# Patient Record
Sex: FEMALE | URBAN - METROPOLITAN AREA
[De-identification: names, ages, dates, MRNs, and addresses within clinical notes are randomized per-mention and may not be internally consistent; named-entity substitution may affect disease eponyms.]

---
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
## 2023-10-06 ENCOUNTER — TELEPHONE (OUTPATIENT)
Dept: ENDOCRINOLOGY | Age: 18
End: 2023-10-06

## 2023-10-06 NOTE — TELEPHONE ENCOUNTER
Patient's mom is our clinic patient.  Okay to schedule for Hashimoto's, she has a referral from [facility]

## 2023-10-06 NOTE — TELEPHONE ENCOUNTER
Per Dr. Marbin Lynn this pt can be scheduled as a NP for Hashimoto's. She has a referral from [facility].  You can schedule 2 back to back VV as a NP to get her seen sooner

## 2023-10-20 ENCOUNTER — TELEPHONE (OUTPATIENT)
Dept: ENDOCRINOLOGY | Age: 18
End: 2023-10-20

## 2023-10-20 ENCOUNTER — TELEMEDICINE (OUTPATIENT)
Dept: ENDOCRINOLOGY | Age: 18
End: 2023-10-20

## 2023-10-20 DIAGNOSIS — E03.9 ACQUIRED HYPOTHYROIDISM: Primary | ICD-10-CM

## 2023-10-20 PROCEDURE — 99204 OFFICE O/P NEW MOD 45 MIN: CPT | Performed by: INTERNAL MEDICINE

## 2023-10-20 RX ORDER — NORETHINDRONE ACETATE AND ETHINYL ESTRADIOL, ETHINYL ESTRADIOL AND FERROUS FUMARATE 1MG-10(24)
1 KIT ORAL DAILY
COMMUNITY

## 2023-10-20 RX ORDER — ONDANSETRON 4 MG/1
4 TABLET, FILM COATED ORAL EVERY 8 HOURS PRN
COMMUNITY

## 2023-10-20 RX ORDER — LEVOTHYROXINE SODIUM 0.05 MG/1
50 TABLET ORAL DAILY
Qty: 30 TABLET | Refills: 3 | Status: SHIPPED | OUTPATIENT
Start: 2023-10-20

## 2023-10-20 RX ORDER — LEVOTHYROXINE SODIUM 0.05 MG/1
50 TABLET ORAL DAILY
COMMUNITY
End: 2023-10-20 | Stop reason: SDUPTHER

## 2023-10-20 NOTE — TELEPHONE ENCOUNTER
Pt called in with a fax number for [facility] for labs to be faxed to once Dr. Rye Qureshi places them  [phone]

## 2023-10-20 NOTE — PROGRESS NOTES
Patient was evaluated through a synchronous (real-time) audio-video encounter. The patient (or guardian if applicable) is aware that this is a billable service, which includes applicable co-pays. This Virtual Visit was conducted with patient's (and/or legal guardian's) consent. Patient identification was verified, and a caregiver was present when appropriate. The patient was located at Home:   Provider was located at [address] [place] [address]! Confirm you are appropriately licensed, registered, or certified to deliver care in the state where the patient is located as indicated above. If you are not or unsure, please re-schedule the visit. Are you appropriately licensed in the patient's state?: Yes      Subjective:      22 y/o WF who is here for thyroid evaluation. Noted high TSH on labs    Labs:    9/23 TSH 7.66  12/22 TSH 2.99    She was started on levothyroxine 50mcg    Current complaints: see HPI  Increased sleep  Had to drink a lot of coffee, not much motivation  Improved with levothyroxine    Mild   Controlled    No worsening factors    History of radiation to patient's neck:   no  Family history includes mom, dad, brother Hashimoto's  Family history of thyroid cancer:  no    Current smoking of cigarettes or cigars: no    She states low iron, was started on iron supplement    SH:  She studies at [address] , on campus    No past medical history on file. No past surgical history on file. Current Outpatient Medications   Medication Sig Dispense Refill    levothyroxine (SYNTHROID) 50 MCG tablet Take 1 tablet by mouth Daily      ondansetron (ZOFRAN) 4 MG tablet Take 1 tablet by mouth every 8 hours as needed for Nausea or Vomiting      norethindrone-ethinyl estradiol-Fe (LO LOESTRIN FE) 1 MG-10 MCG / 10 MCG tablet Take 1 tablet by mouth daily       No current facility-administered medications for this visit. Review of Systems  Constitutional: Negative for weight loss recently .  Negative for fever

## 2023-10-23 LAB
FERRITIN: 52 NG/ML (ref 15–150)
IRON SATURATION: 27 % (ref 15–55)
IRON: 129 MCG/DL (ref 30–160)
TOTAL IRON BINDING CAPACITY: 470 MCG/DL (ref 260–450)
TRANSFERRIN: 336 MG/DL (ref 203–362)
TSH ULTRASENSITIVE: 1.46 (ref 0.27–4.2)

## 2023-10-24 LAB — THYROID PEROXIDASE ANTIBODIES: 97.42 IU/ML

## 2023-10-25 ENCOUNTER — TELEPHONE (OUTPATIENT)
Dept: ENDOCRINOLOGY | Age: 18
End: 2023-10-25

## 2023-10-25 LAB — THYROGLOBULIN AB: 31.2 IU/ML (ref 0–4)

## 2023-10-25 NOTE — TELEPHONE ENCOUNTER
Pt called in and gave lab results verbatim with Dr. Clovis Moise message. Pt understood. Pt asked when she should follow up. Please advise.

## 2024-04-15 DIAGNOSIS — E03.9 ACQUIRED HYPOTHYROIDISM: Primary | ICD-10-CM

## 2024-04-15 RX ORDER — LEVOTHYROXINE SODIUM 0.05 MG/1
50 TABLET ORAL DAILY
Qty: 30 TABLET | Refills: 3 | Status: SHIPPED | OUTPATIENT
Start: 2024-04-15

## 2024-04-15 NOTE — TELEPHONE ENCOUNTER
Medication:   Requested Prescriptions     Pending Prescriptions Disp Refills    levothyroxine (SYNTHROID) 50 MCG tablet [Pharmacy Med Name: LEVOTHYROXINE 50 MCG TABLET] 30 tablet 3     Sig: TAKE 1 TABLET BY MOUTH DAILY       Last Filled:      Patient Phone Number: [phone] (home)     Last appt: 10/20/2023   Next appt: 5/28/2024    Last Thyroid:   Lab Results   Component Value Date/Time    TSH 1.46 10/23/2023 12:45 PM

## 2024-05-28 ENCOUNTER — OFFICE VISIT (OUTPATIENT)
Dept: ENDOCRINOLOGY | Age: 19
End: 2024-05-28
Payer: COMMERCIAL

## 2024-05-28 VITALS
BODY MASS INDEX: 20.73 KG/M2 | OXYGEN SATURATION: 98 % | SYSTOLIC BLOOD PRESSURE: 124 MMHG | RESPIRATION RATE: 14 BRPM | DIASTOLIC BLOOD PRESSURE: 82 MMHG | WEIGHT: 117 LBS | HEART RATE: 68 BPM | HEIGHT: 63 IN

## 2024-05-28 DIAGNOSIS — E03.9 ACQUIRED HYPOTHYROIDISM: ICD-10-CM

## 2024-05-28 DIAGNOSIS — E03.9 ACQUIRED HYPOTHYROIDISM: Primary | ICD-10-CM

## 2024-05-28 PROCEDURE — 99214 OFFICE O/P EST MOD 30 MIN: CPT | Performed by: INTERNAL MEDICINE

## 2024-05-28 RX ORDER — FLUOXETINE HYDROCHLORIDE 20 MG/1
20 CAPSULE ORAL DAILY
COMMUNITY

## 2024-05-28 RX ORDER — OMEPRAZOLE 10 MG/1
10 CAPSULE, DELAYED RELEASE ORAL DAILY
COMMUNITY

## 2024-05-28 NOTE — PROGRESS NOTES
Patient was evaluated through a synchronous (real-time) audio-video encounter. The patient (or guardian if applicable) is aware that this is a billable service, which includes applicable co-pays. This Virtual Visit was conducted with patient's (and/or legal guardian's) consent. Patient identification was verified, and a caregiver was present when appropriate.   The patient was located at Home:   Provider was located at Home     STOP! Confirm you are appropriately licensed, registered, or certified to deliver care in the state where the patient is located as indicated above. If you are not or unsure, please re-schedule the visit.    Are you appropriately licensed in the patient's state?: Yes      Subjective:      18 y/o WF who is here for thyroid evaluation.     Interim:    Weight loss  Reports mononucleosis  Anxiety before levothyroxine    Noted high TSH on labs    Labs:    9/23 TSH 7.66  12/22 TSH 2.99    She was started on levothyroxine 50mcg    Current complaints: see HPI  Increased sleep  Had to drink a lot of coffee, not much motivation  Improved with levothyroxine    Mild   Controlled    No worsening factors    History of radiation to patient's neck:   no  Family history includes mom, dad, brother Hashimoto's  Family history of thyroid cancer:  no    Current smoking of cigarettes or cigars: no    She states low iron, was started on iron supplement    10/23 TPO Ab 97.42  Tg Ab 31.2    TSH 1.46      SH:  She studies at [employer]  In [place], on campus    No past medical history on file.  No past surgical history on file.  Current Outpatient Medications   Medication Sig Dispense Refill    FLUoxetine (PROZAC) 20 MG capsule Take 1 capsule by mouth daily      omeprazole (PRILOSEC) 10 MG delayed release capsule Take 1 capsule by mouth daily      levothyroxine (SYNTHROID) 50 MCG tablet TAKE 1 TABLET BY MOUTH DAILY 30 tablet 3    ondansetron (ZOFRAN) 4 MG tablet Take 1 tablet by mouth every 8 hours as needed for

## 2024-05-29 LAB
FERRITIN SERPL IA-MCNC: 37.3 NG/ML (ref 15–150)
TSH SERPL DL<=0.005 MIU/L-ACNC: 2.09 UIU/ML (ref 0.27–4.2)

## 2024-06-01 ENCOUNTER — HOSPITAL ENCOUNTER (OUTPATIENT)
Dept: ULTRASOUND IMAGING | Age: 19
Discharge: HOME | End: 2024-06-01
Attending: INTERNAL MEDICINE
Payer: COMMERCIAL

## 2024-06-01 DIAGNOSIS — E03.9 ACQUIRED HYPOTHYROIDISM: ICD-10-CM

## 2024-06-01 PROCEDURE — 76536 US EXAM OF HEAD AND NECK: CPT

## 2024-06-03 ENCOUNTER — TELEPHONE (OUTPATIENT)
Dept: ENDOCRINOLOGY | Age: 19
End: 2024-06-03

## 2024-07-29 ENCOUNTER — PATIENT MESSAGE (OUTPATIENT)
Dept: ENDOCRINOLOGY | Age: 19
End: 2024-07-29

## 2024-07-29 DIAGNOSIS — E03.9 ACQUIRED HYPOTHYROIDISM: Primary | ICD-10-CM

## 2024-07-29 NOTE — TELEPHONE ENCOUNTER
Please advise patient she can have thyroid level checked to assess if the hypothyroidism is worsening

## 2024-07-29 NOTE — TELEPHONE ENCOUNTER
From: Jeanette Santana  To: Dr. Sean Allan  Sent: 7/29/2024 2:01 PM EDT  Subject: Hashimotos    Hi Dr Allan,   Lately I’ve noticed my Hashimoto’s symptoms are getting worse. I don’t know if it could be stress induced (I’ve been very stressed a lot of the summer due to my schedule) or what but I’ve been feeling really freezing at work all the time, more tired during the day (even after having caffeine), and I’ve felt more achy, even when working out consistently. I want to get this straightened out before I go back to school in late August.

## 2024-07-31 DIAGNOSIS — E03.9 ACQUIRED HYPOTHYROIDISM: ICD-10-CM

## 2024-07-31 LAB — TSH SERPL DL<=0.005 MIU/L-ACNC: 2.29 UIU/ML (ref 0.27–4.2)

## 2024-08-09 DIAGNOSIS — E03.9 ACQUIRED HYPOTHYROIDISM: ICD-10-CM

## 2024-08-12 RX ORDER — LEVOTHYROXINE SODIUM 0.05 MG/1
50 TABLET ORAL DAILY
Qty: 30 TABLET | Refills: 4 | Status: SHIPPED | OUTPATIENT
Start: 2024-08-12

## 2024-08-12 NOTE — TELEPHONE ENCOUNTER
Medication:   Requested Prescriptions     Pending Prescriptions Disp Refills    levothyroxine (SYNTHROID) 50 MCG tablet [Pharmacy Med Name: LEVOTHYROXINE 50 MCG TABLET] 30 tablet 4     Sig: TAKE 1 TABLET BY MOUTH DAILY       Last Filled:      Patient Phone Number: [phone] (home)     Last appt: 5/28/2024   Next appt: 1/6/2025    Last Thyroid:   Lab Results   Component Value Date/Time    TSH 2.29 07/31/2024 12:22 PM

## 2024-11-19 DIAGNOSIS — E03.9 ACQUIRED HYPOTHYROIDISM: ICD-10-CM

## 2024-11-19 RX ORDER — LEVOTHYROXINE SODIUM 50 UG/1
50 TABLET ORAL DAILY
Qty: 30 TABLET | Refills: 4 | Status: SHIPPED | OUTPATIENT
Start: 2024-11-19

## 2024-11-19 NOTE — TELEPHONE ENCOUNTER
Requested Prescriptions     Pending Prescriptions Disp Refills    levothyroxine (SYNTHROID) 50 MCG tablet 30 tablet 4     Sig: Take 1 tablet by mouth Daily       Last OV 05/28/2024  Next OV 01/06/2025       Component  Ref Range & Units 7/31/24 1222 5/28/24 1308   TSH  0.27 - 4.20 uIU/mL 2.29 2.09

## 2024-11-19 NOTE — TELEPHONE ENCOUNTER
Refill is needed      levothyroxine (SYNTHROID) 50 MCG tablet     [facility]    [address] [place] [address]    [phone]

## 2025-01-06 ENCOUNTER — TELEMEDICINE (OUTPATIENT)
Dept: ENDOCRINOLOGY | Age: 20
End: 2025-01-06
Payer: COMMERCIAL

## 2025-01-06 DIAGNOSIS — E03.9 ACQUIRED HYPOTHYROIDISM: Primary | ICD-10-CM

## 2025-01-06 PROCEDURE — 99213 OFFICE O/P EST LOW 20 MIN: CPT | Performed by: INTERNAL MEDICINE

## 2025-01-06 NOTE — PROGRESS NOTES
Patient was evaluated through a synchronous (real-time) audio-video encounter. The patient (or guardian if applicable) is aware that this is a billable service, which includes applicable co-pays. This Virtual Visit was conducted with patient's (and/or legal guardian's) consent. Patient identification was verified, and a caregiver was present when appropriate.   The [place], [place]:   Provider was [place], [place]    STOP! Confirm you are appropriately licensed, registered, or certified to deliver care in the state where the patient is located as indicated above. If you are not or unsure, please re-schedule the visit.    Are you appropriately licensed in the patient's state?: Yes          Subjective:      20 y/o WF who is here for thyroid evaluation.     Interim:    fatigue    Noted high TSH on labs    Labs:    9/23 TSH 7.66  12/22 TSH 2.99    She was started on levothyroxine 50mcg    Current complaints: see HPI  Increased sleep  Had to drink a lot of coffee, not much motivation  Improved with levothyroxine    Mild   Controlled    No worsening factors    History of radiation to patient's neck:   no  Family history includes mom, dad, brother Hashimoto's  Family history of thyroid cancer:  no    Current smoking of cigarettes or cigars: no    She states low iron, was started on iron supplement  Not taking iron supplement regularly    10/23 TPO Ab 97.42  Tg Ab 31.2    TSH 1.46    7/24 TSH 2.29    SH:  She studies at [employer]  In [place], on campus    No past medical history on file.  No past surgical history on file.  Current Outpatient Medications   Medication Sig Dispense Refill    levothyroxine (SYNTHROID) 50 MCG tablet Take 1 tablet by mouth Daily 30 tablet 4    FLUoxetine (PROZAC) 20 MG capsule Take 1 capsule by mouth daily      omeprazole (PRILOSEC) 10 MG delayed release capsule Take 1 capsule by mouth daily      ondansetron (ZOFRAN) 4 MG tablet Take 1 tablet by mouth every 8 hours as needed

## 2025-01-12 ENCOUNTER — PATIENT MESSAGE (OUTPATIENT)
Dept: ENDOCRINOLOGY | Age: 20
End: 2025-01-12

## 2025-01-13 DIAGNOSIS — E03.9 ACQUIRED HYPOTHYROIDISM: ICD-10-CM

## 2025-01-14 ENCOUNTER — TELEPHONE (OUTPATIENT)
Age: 20
End: 2025-01-14

## 2025-01-14 LAB
DEPRECATED RDW RBC AUTO: 13.9 % (ref 12.4–15.4)
FERRITIN SERPL IA-MCNC: 51.2 NG/ML (ref 15–150)
HCT VFR BLD AUTO: 45.5 % (ref 36–48)
HGB BLD-MCNC: 15 G/DL (ref 12–16)
MCH RBC QN AUTO: 32 PG (ref 26–34)
MCHC RBC AUTO-ENTMCNC: 33 G/DL (ref 31–36)
MCV RBC AUTO: 96.8 FL (ref 80–100)
PLATELET # BLD AUTO: 465 K/UL (ref 135–450)
PMV BLD AUTO: 8.4 FL (ref 5–10.5)
RBC # BLD AUTO: 4.7 M/UL (ref 4–5.2)
TSH SERPL DL<=0.005 MIU/L-ACNC: 1.41 UIU/ML (ref 0.27–4.2)
WBC # BLD AUTO: 7.7 K/UL (ref 4–11)

## 2025-01-14 NOTE — TELEPHONE ENCOUNTER
----- Message from Dr. Sean Allan MD sent at 1/14/2025 10:37 AM EST -----  Please advise patient the thyroid level is good. Ferritin which is measure of iron stores is slightly better.  Platelets are slightly high , would recommend to follow with PCP for it.

## 2025-04-14 DIAGNOSIS — E03.9 ACQUIRED HYPOTHYROIDISM: Primary | ICD-10-CM

## 2025-04-14 RX ORDER — LEVOTHYROXINE SODIUM 50 UG/1
50 TABLET ORAL DAILY
Qty: 90 TABLET | Refills: 1 | Status: SHIPPED | OUTPATIENT
Start: 2025-04-14

## 2025-04-14 NOTE — TELEPHONE ENCOUNTER
Medication:   Requested Prescriptions     Pending Prescriptions Disp Refills    levothyroxine (SYNTHROID) 50 MCG tablet 90 tablet 1     Sig: Take 1 tablet by mouth daily       Last Filled:      Patient Phone Number: [phone] (home)     Last appt: 1/6/2025   Next appt: Visit date not found    Last Thyroid:   Lab Results   Component Value Date/Time    TSH 2.29 07/31/2024 12:22 PM

## 2025-08-22 ENCOUNTER — TELEMEDICINE (OUTPATIENT)
Dept: ENDOCRINOLOGY | Age: 20
End: 2025-08-22
Payer: COMMERCIAL

## 2025-08-22 DIAGNOSIS — E03.9 ACQUIRED HYPOTHYROIDISM: Primary | ICD-10-CM

## 2025-08-22 PROCEDURE — 99214 OFFICE O/P EST MOD 30 MIN: CPT | Performed by: INTERNAL MEDICINE

## 2025-08-22 RX ORDER — LEVOTHYROXINE SODIUM 50 UG/1
50 TABLET ORAL DAILY
Qty: 90 TABLET | Refills: 3 | Status: SHIPPED | OUTPATIENT
Start: 2025-08-22 | End: 2025-08-22

## 2025-08-22 RX ORDER — LEVOTHYROXINE SODIUM 75 UG/1
75 TABLET ORAL DAILY
Qty: 90 TABLET | Refills: 1 | Status: SHIPPED | OUTPATIENT
Start: 2025-08-22